# Patient Record
Sex: MALE | Race: WHITE | NOT HISPANIC OR LATINO | URBAN - METROPOLITAN AREA
[De-identification: names, ages, dates, MRNs, and addresses within clinical notes are randomized per-mention and may not be internally consistent; named-entity substitution may affect disease eponyms.]

---

## 2024-08-05 ENCOUNTER — OFFICE VISIT (OUTPATIENT)
Dept: GENETICS | Facility: HOSPITAL | Age: 24
End: 2024-08-05
Payer: COMMERCIAL

## 2024-08-05 VITALS
DIASTOLIC BLOOD PRESSURE: 70 MMHG | WEIGHT: 222.66 LBS | OXYGEN SATURATION: 96 % | RESPIRATION RATE: 16 BRPM | SYSTOLIC BLOOD PRESSURE: 106 MMHG | HEIGHT: 68 IN | BODY MASS INDEX: 33.75 KG/M2 | HEART RATE: 95 BPM

## 2024-08-05 DIAGNOSIS — F84.0 AUTISM (HHS-HCC): Primary | ICD-10-CM

## 2024-08-05 PROCEDURE — 3008F BODY MASS INDEX DOCD: CPT | Performed by: MEDICAL GENETICS

## 2024-08-05 PROCEDURE — 99205 OFFICE O/P NEW HI 60 MIN: CPT | Performed by: MEDICAL GENETICS

## 2024-08-05 PROCEDURE — 99417 PROLNG OP E/M EACH 15 MIN: CPT | Performed by: MEDICAL GENETICS

## 2024-08-05 PROCEDURE — 99215 OFFICE O/P EST HI 40 MIN: CPT | Performed by: MEDICAL GENETICS

## 2024-08-05 ASSESSMENT — ENCOUNTER SYMPTOMS
EYES NEGATIVE: 1
ROS SKIN COMMENTS: ECZEMA
HEMATURIA: 1
ALLERGIC/IMMUNOLOGIC NEGATIVE: 1
GASTROINTESTINAL NEGATIVE: 1
SEIZURES: 0

## 2024-08-05 NOTE — PROGRESS NOTES
Subjective   Patient ID: Melquiades Granado is a 24 y.o. male who presents for a new patient visit.  Accompanied by mother.     APOLINAR Arnett is a 24-year-old male with encephalopathy, autism spectrum disorder, POTs syndrome, and anxiety disorder. He is self-referred.    PMHx:   - POTS (began at age 14)  - Autism spectrum  - Hashimoto's disease (takes medication for this).    Previous Genetic evaluation: Had chromosomal microarray from 2009 which showed a variant of unknown significance, gain of material. Mother showed me this at her own genetics visit, but did not have access to the report today. Normal karyotype by report, but results not in chart. Fragile X testing was negative, per mother.     Current medication: Melatonin, Vitamin D  Currently not taking carvedilol or fludrocortisone    Developmental History:  Gross Motor: No concern. On time/early.     Fine Motor: No concern.     Speech/language: Nonverbal until 8 years old. Would gain and lose language at a young age.     Cognitive/adaptive/therapies: Currently considering college, at first was planning to pursue a program for people with autism but now considering traditional classes.  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 He does not have a 's license and does not wish to drive. Mother believes he may have had IQ testing in high school. He graduated from high school in 2020 at 20 years old. He had an IEP and was in a resource room with other students with disabilities and had some life skill classes. He is good at math (took algebra and geometry). Reading at the 10th grade level. Struggles with verbal comprehension and remembering names/faces. Was able to get disability for having autism. Skin picking behavior. Anxiety goes back to a young age, especially in social/public situations.         Social History: Currently living in [place] near relatives (has separate living space for independence), but may be moving back to [place] at some  point. He enjoys playing video games and watching TV / Youtube, and assembling models.       Family History:     Mother (51 yr): Muscle weakness (particularly facial weakness and scapular winging) concerning for fascioscapulohumeral muscular dystrophy (FSHD), as well as dysautonomia/POTS, migraine, Raynaud's, small fiber neuropathy, autoimmune disease, and atrophy of the esophagus. (Her neuromuscular and autonomic symptoms are believed to be separate, per Dr. Hutson.) However, genetic testing for FSHD types 1 and 2 and neuromuscular disease gene panel testing has been normal or non-diagnostic. It is my understanding that her neurologist does not feel that a clinical diagnosis of Parsonage-Vazquez syndrome is appropriate, as this would not explain her facial weakness, and testing for the genetic form (10% of cases) was negative. She has a confirmed diagnosis of SLE (and now, also Sjogren), but this is also not thought to explain her neuromuscular presentation.  She had negative whole exome sequencing. I nominated her for the [facility]omes Project.     Three maternal cousins:  IEP/delays (not tested for autism)   Two maternal male cousins once removed ages 3 and 2: Speech delayed     Paternal male cousin: Struggles socially, but very intelligent, per mother.   Paternal male second cousin: Diagnosed with autism      Specialist Evaluations:  Neurology - Cristiano Gary MD; 6/21/2022:  “Melquiades Granado is a 22-year-old young man who was seen today for follow-up of his encephalopathy due to autism spectrum disorder and history of pots syndrome and anxiety disorder. Since last seen he has done well on Florinef and Prozac. He denies having any new symptoms today's physical and neurological examination was nonfocal except for cognitive delay I would like to continue his medication the way he is taking and has been complaining of on and off pain in the back when he is standing for a long time which I believe is most likely muscle  ache and if the symptoms get worse or if he is having persistent pain need to see earlier.”    Internal Medicine - Kiko Ramos MD; 12/30/2021:  “This is a pleasant 21-year-old boy who has autism spectrum disorder with Asperger's syndrome. Mom is worried that he has come up with a new butterfly rash in his face. She has history of lupus in the family. Mom wants to have it screening test for lupus for him. Although he does not have any other obvious features. His rash mostly appears to be rosacea to me. He does have history of eczema as well. Otherwise mother has used some low potency hydrocortisone cream which helps to clear up the rash.”    Pediatric Nephrology - Benton Lugo MD; 5/17/2018:  “Hypocitraturia - Likely explains gross hematuria with illness or periods of dehydration. Expect this is due to crystal formation at that time. No stones or nephrocalcinosis on ultrasound. Will treat with fluid intake alone at this point, but if gross hematuria or dysuria persist will likely start potassium citrate supplement.     Plan:  64 oz of non-caffeinated fluid daily  Encouraged increasing fruits and vegetables if possible. He remains a very picky eater”    Review of Systems   Constitutional:         Normal stature but somewhat short for his family    HENT: Negative.     Eyes: Negative.    Respiratory:          Asthma    Cardiovascular:         POTS  Had tilt table test   Gastrointestinal: Negative.    Endocrine:         Hashimoto's disease   Genitourinary:  Positive for hematuria (crystals).   Skin:         Eczema    Allergic/Immunologic: Negative.    Neurological:  Negative for seizures.        Pre-syncope    Psychiatric/Behavioral:          ADHD  Anxiety  OCD - somewhat improved        Objective   Physical Exam  Constitutional:       Appearance: Nondysmorphic. Reduced eye contact, answered questions when addressed. Engaged well when specifically asked questions.   HENT:      Head: Normocephalic. Normal  "hair distribution.  Normal hairlines. Thick hair.      Right Ear: External ear normal morphology and position. (Shaped like mother's)     Left Ear: External ear normal morphology and position. (Shaped like mother's)     Nose: Nose normal. Slender.      Lips: Normal, with normal philtrum.  Arms:     Hyperextensible in elbows, especially right elbow. Beighton score 1/9.   Hands:     Normal creases.  Grossly normal fingers. Mild fetal fingertip pads.  Mother also has these.  Neurological:      Tone: UEs normal. Paratonia in LEs.      Deep Tendon Reflexes: Patellae 2/4, left Achilles 2-/4, right Achilles 2/4, UE 2/4     Strength: Grossly normal 5/5 strength bilateral UE and LE       Assessment/Plan     It was a pleasure to meet you today, Melquiades.     Your mother believes you may have had an IQ test in high school and plans to send to me results if she is able to find it.     We are sometimes able to find a single gene or chromosome difference responsible for autism.  When we cannot, it may be because the person has more than 1 genetic risk factor combining together, or a very rare genetic cause that has not been tested.  Genetics are thought to play some role for most individuals with autism spectrum disorder.    If a genetic diagnosis is obtained, this may tell us whether there are any other health issues to watch for related to the gene, and tell us the chance that another child in the family/extended family may be at risk to inherit the same condition.  I suspect your POTS is separate and we generally do not have useful genetic testing for POTS.    We discussed repeating the chromosomal microarray test (that you had in 2009) due to improvements in technology vs. reaching out to original lab that did your testing for an update.  Your mother will send me the report to see if if it was \"oligoarray\" or \"SNP\" version of the test.   If it was the older oligoarray, we plan to repeat it. If SNP, I will send a medical record " release form to you in a self-addressed stamped envelope to be signed and returned, so that we can talk with the lab that did the testing.     Gene panels also exist for autism.  These panels have a better chance of giving a diagnosis if the person has birth defects, unusual facial features, and/or low IQ.  The [facility] Autism/ID Xpanded test can be considered regardless, but I would likely only recommend a larger test called whole exome sequencing if you have low IQ, as this significantly increases the chance of a positive result with the exome test.   (You do not have any known birth defects or unusual facial features.)  The two tests would likely focus on a very similar group of genes, however.    I will contact our [facility]  to check whether [place] Medicaid would cover the cost of any of this testing if needed.  For [employer] insurance, a benefit investigation would also be completed reflecting the cost without Medicaid.      Plan:  Your mother will send previous chromosomal microarray testing report through your (Melquiades's) [employer] as either a scan or photo. If it was oligoarray, plan to order a newer version of the test, which can detect some findings that are not detectable by the oligoarray. If SNP, I will send a medical record release form in a self addressed stamped envelope to be signed and returned, so that I can ask the lab about the previous variant of unknown significance (inconclusive result).     If able to find it, mother will send your IQ testing results as an attachment through [employer].  I asked her to please let me know if she is unable to find it.  If IQ in a normal range, consider [facility] autism/ID panel, which is suitable for isolated autism. If IQ is lower than the average range, consider the [facility] whole exome sequencing test.     Testing can be done via cheek swab kit mailed to your home.  We discussed that the gene panel/exome also would involve a free comparison cheek swab from  relatives.    On the [facility] consent form (for any test), there will be the option to opt out of sharing with data with researchers. I recommend NOT opting out, in case we have questions for the researchers, but your identity will not be revealed until you consent to this later.    I will contact [email or web address]  to ask whether [place] Medicaid works like [place] Medicaid to cover the cost of testing if needed.     Melquiades gave verbal consent to discuss healthcare and logistics with his mother. Primary contact - mother via phone.      If you have any questions, please call Nova Garcia in the [facility] at [phone] option 1 or at her direct number [phone] or send me a non-urgent message through [employer].     Madeleine Nieves MD  Clinical     Visit Time: 11:41 - 12:47    Documentation Time: 2:49 - 2:58    Scribe Attestation  By signing my name below, I, Audra Alicea , Scribe   attest that this documentation has been prepared under the direction and in the presence of Madeleine Nieves MD.

## 2024-09-21 ENCOUNTER — DOCUMENTATION (OUTPATIENT)
Dept: GENETICS | Facility: CLINIC | Age: 24
End: 2024-09-21
Payer: COMMERCIAL

## 2024-09-21 DIAGNOSIS — R41.83 BORDERLINE INTELLECTUAL DISABILITY: ICD-10-CM

## 2024-09-21 DIAGNOSIS — F84.0 AUTISM (HHS-HCC): Primary | ICD-10-CM

## 2024-09-21 NOTE — PROGRESS NOTES
Mother submitted documentation noting that Melquiades's full-scale IQ was 76.      His previous chromosomal microarray results were not able to be located by the hospital that ordered them.  I reviewed recent literature that suggested that the 2009 version of the chromosome microarray test also may be less sensitive than the currently available testing due to differences in technology.  Thus, I will order a new chromosomal microarray test.

## 2024-09-24 ENCOUNTER — TELEPHONE (OUTPATIENT)
Dept: GENETICS | Facility: CLINIC | Age: 24
End: 2024-09-24
Payer: COMMERCIAL

## 2024-09-24 NOTE — TELEPHONE ENCOUNTER
Called number on back of patient's insurance card for prior authorization of genetic testing ordered by Dr. Nieves, and spoke with Martha BUTLER The CPT code 13783 did not require prior authorization. Predetermination is available and can be requested by mailing the request to the following address:  [facility]  [id number]  [place], [address]    Call ref. ID: [id number]

## 2024-11-07 ENCOUNTER — TELEMEDICINE (OUTPATIENT)
Dept: GENETICS | Facility: HOSPITAL | Age: 24
End: 2024-11-07
Payer: COMMERCIAL

## 2024-11-07 DIAGNOSIS — R41.83 BORDERLINE INTELLECTUAL DISABILITY: ICD-10-CM

## 2024-11-07 DIAGNOSIS — F84.0 AUTISM (HHS-HCC): Primary | ICD-10-CM

## 2024-11-07 PROCEDURE — 99215 OFFICE O/P EST HI 40 MIN: CPT | Performed by: MEDICAL GENETICS

## 2024-11-07 NOTE — PROGRESS NOTES
Subjective   Patient ID: Melquiades Granado is a 24 y.o. male who presents for a follow up visit.  Accompanied by mother. Father joined via speaker phone.     This visit was completed via Telehealth, using synchronous/interactive audio and video. All issues as below were discussed and addressed but no physical exam was performed. If it was felt that the patient should be evaluated in clinic then they were directed there. The patient verbally consented to the visit and participated from his mother's home in [place].     APOLINAR Arnett is a 24-year-old male with encephalopathy (borderline intellectual disability), autism spectrum disorder, POTS, and anxiety disorder. He was last seen in genetics on 8/5/2024. On 9/21/2024 I noted, “His previous chromosomal microarray results were not able to be located by the hospital that ordered them.  I reviewed recent literature that suggested that the 2009 version of the chromosome microarray test also may be less sensitive than the currently available testing due to differences in technology.  Thus, I will order a new chromosomal microarray test.” His chromosomal microarray test was normal. He is here today for a follow up visit and to discuss next steps.       Previous Genetic evaluation:  Chromosomal microarray - report date: 10/30/24, result: NEGATIVE   Per my last note, Had chromosomal microarray from 2009 which showed a variant of unknown significance, gain of material. Mother showed me this at her own genetics visit, but did not have access to the report today. [She notes she no longer has this report.]  Normal karyotype by report, but results not in chart. Fragile X testing was negative, per mother.     Interval History  Had a cyst draining from his navel, resolved with antibiotics. Mother notes he has had a bruise in the stomach area for 3-4 months. Had a CT scan, because the cyst was suspected to be a urachal cyst, but it was not. The CT incidentally showed prostatitis. He will be  "seeing a urologist.  Since age 8, he had on and off episodes of protein and blood in the urine.     Hashimoto's thyroiditis is improving with medication, so he does not have to follow up with endocrine for another 6 months.     PCP - [facility] ([place] Dorota Quinteros PA-C    Developmental Progress: Mother submitted documentation noting that Melquiades's full-scale IQ was 76.     Family History Updates:  Mother: Had cancer genetic testing, was negative. She was accepted by the P for her neuromuscular issues.   Paternal Grandmother: Had polyps, colon cancer gene testing was negative.      There is not a known cancer gene mutation on either side of the family.     Assessment/Plan     It was a pleasure to meet with you virtually today.     Melquiades, you are a candidate for whole exome sequencing.     We discussed the benefits and limitations of the whole exome sequencing test, which examines the working regions of more than 20,000 genes. The whole exome sequencing test at [facility] laboratory has about a 25% (as high as 50% with those with intellectual disability) overall success rate in providing a diagnosis. Some of the reasons that this number is not higher may include: some genes not examined in as much detail as others in the setting of a very broad test, certain types of gene changes are not detectable by this test, and some of the genes that can cause particular symptoms may not have been identified yet (are not well understood). In addition, the test is not designed to diagnose disorders caused by changes in multiple genes (multigenic) or by genes and environmental factors together (multifactorial).    You chose to receive information about the 80+ genes on the medically-actionable \"incidental findings\" list provided by the American College of Medical Genetics and Genomics. Mother and father also chose to receive this information for themselves if you have a positive result on this portion " "of the test. You understand that a normal result for these genes is not a guarantee that there is no increased genetic risk for these diseases. This version of the test will not provide information about carrier status for common diseases or how your body metabolizes medications. We will also examine the mitochondrial DNA (a special type of DNA involved in energy production) as part of this test.    We discussed the Genetic Information Nondiscrimination Act (BARI). We discussed the protections and limitations of BARI. BARI generally makes in illegal for health insurance companies, group health plans, and most employers (with >15 employees) to discriminate based on genetic information. It does not protect against genetic discrimination for life insurance, disability insurance, long-term care, or other insurances.    We discussed, per Bettina's exome criteria, that you meet the criteria of \"developmental delay, autism spectrum disorders, or intellectual disability with onset prior to 18 years of age with no identifiable cause (idiopathic).\"    The results of testing would confirm or establish a clinical diagnosis.     Counseling was performed to address: o Interpretation of family and medical histories to provide a risk assessment for disease occurrence or recurrence (yes, but need a diagnosis to determine inheritance pattern.) o Education about inheritance patterns, genetic testing, disease management, prevention, and resources (Discussed genetic testing, which can determine inheritance pattern and management options.  This will be discussed in more detail once a genetic diagnosis has been identified.) o Counseling to promote informed choices and adaptation to the risk or presence of a genetic condition (discussed risk of a genetic condition) o Counseling for the psychological aspects of genetic testing (discussed) o Counseling should include the following details: ? Limitations of the testing used ? A negative result " does not indicate heritable risk is zero or low ? Identification of incidental secondary findings and inconclusive results called variants of uncertain significance is possible ? Modifications to genetic variants’ pathogenicity interpretations can occur, and patients may be recontacted with reclassified results in the future o Post-test counseling should be performed for genetic test results.  (All of this was discussed, and post-test counseling will be provided.)    We also discussed, regarding the chromosomal microarray, the reasons microarray could be negative when the previous one had a reported finding:   Could have been a variant of unknown significance and not a true positive, and now known to be harmless (benign)  It could still be a variant of unknown significance (and perhaps very small), but may not have met the new lab's criteria for reporting   The lab could have missed it - unlikely as the new version of the test would be more sensitive than the older version    Melquiades's autism and learning issues are very unlikely to be due to a missing chromosome piece after these results.       Plan:  Prior authorization and benefit investigation prior to [facility] testing, this prior auth should take 2-4 weeks. After that, cheek swabs to be shipped to you for the whole exome sequencing test for Melquiades with comparison samples from mother and father. Consent obtained from Melquiades and parents today. Consent forms will be found in cheek swab kits to be signed and returned in the kit. Mother and father have the same address. Melquiades's kit will be shipped to his address in [place].   ADDENDUM 11/12/24: [facility] is no longer offering benefit investigations (cost estimates) as of last week, for out of network plans.  However, the [place] Medicaid secondary should still result in no out-of-pocket charges.  I will confirm with the [facility]  that this is how Melquiades's last test at the same lab was handled, and make sure  that we can expect the same.    Virtual follow-up visit to be scheduled for about 5-6 weeks after cheek swab samples have been collected. As always, Melquiades will need to be in [place] for this visit.     I will check whether my office sent records to the [facility] for his mother.      Cc IRAIDA Peterson     If you have any questions, please call Nova Garcia in the [facility] at [phone] option 1 or at her direct number [phone] or send me a non-urgent message through [employer].     Madeleine Nieves MD  Clinical     Visit Time: 1:05 - 1:53    Documentation Time: 1:55 - 2:00     Scribe Attestation   I, Audra Alicea, am scribing for, and in presence of, MD Bruce Brock's Statement:  The documentation recorded by Audra Alicea acting in Scribe, in my presence, accurately and completely reflects the service(s), I personally performed, and the decisions made by me.  Madeleine Nieves MD

## 2024-11-20 ENCOUNTER — TELEPHONE (OUTPATIENT)
Dept: GENETICS | Facility: CLINIC | Age: 24
End: 2024-11-20
Payer: COMMERCIAL

## 2024-11-20 NOTE — TELEPHONE ENCOUNTER
I called Natalie from [employer] to try to submit a PA, and she said no PA was required for any of the testing. She said that we could still mail it in if we wanted, but it is not necessary. The reference ID for the phone call is [id number].

## 2024-12-29 DIAGNOSIS — R41.83 BORDERLINE INTELLECTUAL DISABILITY: ICD-10-CM

## 2024-12-29 DIAGNOSIS — F84.0 AUTISM (HHS-HCC): Primary | ICD-10-CM
